# Patient Record
Sex: FEMALE | Race: WHITE | Employment: FULL TIME | URBAN - METROPOLITAN AREA
[De-identification: names, ages, dates, MRNs, and addresses within clinical notes are randomized per-mention and may not be internally consistent; named-entity substitution may affect disease eponyms.]

---

## 2018-07-16 PROCEDURE — 88175 CYTOPATH C/V AUTO FLUID REDO: CPT | Performed by: OBSTETRICS & GYNECOLOGY

## 2018-07-17 PROCEDURE — 84402 ASSAY OF FREE TESTOSTERONE: CPT | Performed by: OBSTETRICS & GYNECOLOGY

## 2018-07-17 PROCEDURE — 83002 ASSAY OF GONADOTROPIN (LH): CPT | Performed by: OBSTETRICS & GYNECOLOGY

## 2018-07-17 PROCEDURE — 83001 ASSAY OF GONADOTROPIN (FSH): CPT | Performed by: OBSTETRICS & GYNECOLOGY

## 2018-07-17 PROCEDURE — 83520 IMMUNOASSAY QUANT NOS NONAB: CPT | Performed by: OBSTETRICS & GYNECOLOGY

## 2018-07-17 PROCEDURE — 84403 ASSAY OF TOTAL TESTOSTERONE: CPT | Performed by: OBSTETRICS & GYNECOLOGY

## 2018-07-17 PROCEDURE — 82627 DEHYDROEPIANDROSTERONE: CPT | Performed by: OBSTETRICS & GYNECOLOGY

## 2025-03-14 ENCOUNTER — TELEPHONE (OUTPATIENT)
Dept: OBGYN UNIT | Facility: HOSPITAL | Age: 30
End: 2025-03-14

## 2025-03-14 RX ORDER — MULTIVIT,IRON,MINERALS/LUTEIN
TABLET ORAL
Status: ON HOLD | COMMUNITY

## 2025-03-17 ENCOUNTER — APPOINTMENT (OUTPATIENT)
Dept: OBGYN CLINIC | Facility: HOSPITAL | Age: 30
End: 2025-03-17
Attending: STUDENT IN AN ORGANIZED HEALTH CARE EDUCATION/TRAINING PROGRAM
Payer: COMMERCIAL

## 2025-03-17 ENCOUNTER — HOSPITAL ENCOUNTER (INPATIENT)
Facility: HOSPITAL | Age: 30
LOS: 4 days | Discharge: HOME | End: 2025-03-21
Attending: STUDENT IN AN ORGANIZED HEALTH CARE EDUCATION/TRAINING PROGRAM | Admitting: STUDENT IN AN ORGANIZED HEALTH CARE EDUCATION/TRAINING PROGRAM
Payer: COMMERCIAL

## 2025-03-17 PROBLEM — Z34.90 PREGNANCY (HCC): Status: ACTIVE | Noted: 2025-03-17

## 2025-03-17 LAB
ANTIBODY SCREEN: NEGATIVE
BASOPHILS # BLD AUTO: 0.05 X10(3) UL (ref 0–0.2)
BASOPHILS NFR BLD AUTO: 0.4 %
DEPRECATED RDW RBC AUTO: 43.4 FL (ref 35.1–46.3)
EOSINOPHIL # BLD AUTO: 0.24 X10(3) UL (ref 0–0.7)
EOSINOPHIL NFR BLD AUTO: 2 %
ERYTHROCYTE [DISTWIDTH] IN BLOOD BY AUTOMATED COUNT: 15.2 % (ref 11–15)
GLUCOSE BLDC GLUCOMTR-MCNC: 85 MG/DL (ref 70–99)
GLUCOSE BLDC GLUCOMTR-MCNC: 87 MG/DL (ref 70–99)
HCT VFR BLD AUTO: 40.3 %
HGB BLD-MCNC: 13.4 G/DL
IMM GRANULOCYTES # BLD AUTO: 0.07 X10(3) UL (ref 0–1)
IMM GRANULOCYTES NFR BLD: 0.6 %
LYMPHOCYTES # BLD AUTO: 1.96 X10(3) UL (ref 1–4)
LYMPHOCYTES NFR BLD AUTO: 16.3 %
MCH RBC QN AUTO: 26.4 PG (ref 26–34)
MCHC RBC AUTO-ENTMCNC: 33.3 G/DL (ref 31–37)
MCV RBC AUTO: 79.3 FL
MONOCYTES # BLD AUTO: 1.34 X10(3) UL (ref 0.1–1)
MONOCYTES NFR BLD AUTO: 11.2 %
NEUTROPHILS # BLD AUTO: 8.34 X10 (3) UL (ref 1.5–7.7)
NEUTROPHILS # BLD AUTO: 8.34 X10(3) UL (ref 1.5–7.7)
NEUTROPHILS NFR BLD AUTO: 69.5 %
PLATELET # BLD AUTO: 290 10(3)UL (ref 150–450)
RBC # BLD AUTO: 5.08 X10(6)UL
RH BLOOD TYPE: POSITIVE
RH BLOOD TYPE: POSITIVE
T PALLIDUM AB SER QL IA: NONREACTIVE
WBC # BLD AUTO: 12 X10(3) UL (ref 4–11)

## 2025-03-17 PROCEDURE — 86901 BLOOD TYPING SEROLOGIC RH(D): CPT | Performed by: STUDENT IN AN ORGANIZED HEALTH CARE EDUCATION/TRAINING PROGRAM

## 2025-03-17 PROCEDURE — 86900 BLOOD TYPING SEROLOGIC ABO: CPT | Performed by: STUDENT IN AN ORGANIZED HEALTH CARE EDUCATION/TRAINING PROGRAM

## 2025-03-17 PROCEDURE — 82962 GLUCOSE BLOOD TEST: CPT

## 2025-03-17 PROCEDURE — 85025 COMPLETE CBC W/AUTO DIFF WBC: CPT | Performed by: STUDENT IN AN ORGANIZED HEALTH CARE EDUCATION/TRAINING PROGRAM

## 2025-03-17 PROCEDURE — 86850 RBC ANTIBODY SCREEN: CPT | Performed by: STUDENT IN AN ORGANIZED HEALTH CARE EDUCATION/TRAINING PROGRAM

## 2025-03-17 PROCEDURE — 86780 TREPONEMA PALLIDUM: CPT | Performed by: STUDENT IN AN ORGANIZED HEALTH CARE EDUCATION/TRAINING PROGRAM

## 2025-03-17 PROCEDURE — 3E0P7VZ INTRODUCTION OF HORMONE INTO FEMALE REPRODUCTIVE, VIA NATURAL OR ARTIFICIAL OPENING: ICD-10-PCS | Performed by: STUDENT IN AN ORGANIZED HEALTH CARE EDUCATION/TRAINING PROGRAM

## 2025-03-17 RX ORDER — DEXTROSE, SODIUM CHLORIDE, SODIUM LACTATE, POTASSIUM CHLORIDE, AND CALCIUM CHLORIDE 5; .6; .31; .03; .02 G/100ML; G/100ML; G/100ML; G/100ML; G/100ML
INJECTION, SOLUTION INTRAVENOUS CONTINUOUS
Status: DISCONTINUED | OUTPATIENT
Start: 2025-03-17 | End: 2025-03-19 | Stop reason: HOSPADM

## 2025-03-17 RX ORDER — LIDOCAINE HYDROCHLORIDE 10 MG/ML
30 INJECTION, SOLUTION EPIDURAL; INFILTRATION; INTRACAUDAL; PERINEURAL ONCE
Status: DISCONTINUED | OUTPATIENT
Start: 2025-03-17 | End: 2025-03-19 | Stop reason: HOSPADM

## 2025-03-17 RX ORDER — ONDANSETRON 2 MG/ML
4 INJECTION INTRAMUSCULAR; INTRAVENOUS EVERY 6 HOURS PRN
Status: DISCONTINUED | OUTPATIENT
Start: 2025-03-17 | End: 2025-03-19 | Stop reason: HOSPADM

## 2025-03-17 RX ORDER — IBUPROFEN 600 MG/1
600 TABLET, FILM COATED ORAL ONCE AS NEEDED
Status: DISCONTINUED | OUTPATIENT
Start: 2025-03-17 | End: 2025-03-19 | Stop reason: HOSPADM

## 2025-03-17 RX ORDER — SODIUM CHLORIDE, SODIUM LACTATE, POTASSIUM CHLORIDE, CALCIUM CHLORIDE 600; 310; 30; 20 MG/100ML; MG/100ML; MG/100ML; MG/100ML
INJECTION, SOLUTION INTRAVENOUS AS NEEDED
Status: DISCONTINUED | OUTPATIENT
Start: 2025-03-17 | End: 2025-03-19 | Stop reason: HOSPADM

## 2025-03-17 RX ORDER — CITRIC ACID/SODIUM CITRATE 334-500MG
30 SOLUTION, ORAL ORAL AS NEEDED
Status: DISCONTINUED | OUTPATIENT
Start: 2025-03-17 | End: 2025-03-19 | Stop reason: HOSPADM

## 2025-03-17 RX ORDER — ACETAMINOPHEN 500 MG
1000 TABLET ORAL EVERY 6 HOURS PRN
Status: DISCONTINUED | OUTPATIENT
Start: 2025-03-17 | End: 2025-03-19 | Stop reason: HOSPADM

## 2025-03-17 RX ORDER — TERBUTALINE SULFATE 1 MG/ML
0.25 INJECTION SUBCUTANEOUS AS NEEDED
Status: DISCONTINUED | OUTPATIENT
Start: 2025-03-17 | End: 2025-03-19 | Stop reason: HOSPADM

## 2025-03-17 RX ORDER — ACETAMINOPHEN 500 MG
500 TABLET ORAL EVERY 6 HOURS PRN
Status: DISCONTINUED | OUTPATIENT
Start: 2025-03-17 | End: 2025-03-19 | Stop reason: HOSPADM

## 2025-03-17 NOTE — PROGRESS NOTES
Pt is a 29 year old female admitted to LDR3/LDR3-A.     Chief Complaint   Patient presents with    Scheduled Induction     A2GDM      Pt is  Unknown intra-uterine pregnancy.  History obtained, consents signed. Oriented to room, staff, and plan of care.

## 2025-03-18 ENCOUNTER — ANESTHESIA EVENT (OUTPATIENT)
Dept: OBGYN UNIT | Facility: HOSPITAL | Age: 30
End: 2025-03-18
Payer: COMMERCIAL

## 2025-03-18 ENCOUNTER — ANESTHESIA (OUTPATIENT)
Dept: OBGYN UNIT | Facility: HOSPITAL | Age: 30
End: 2025-03-18
Payer: COMMERCIAL

## 2025-03-18 LAB
ALBUMIN SERPL-MCNC: 3.8 G/DL (ref 3.2–4.8)
ALBUMIN/GLOB SERPL: 1.7 {RATIO} (ref 1–2)
ALP LIVER SERPL-CCNC: 117 U/L
ALT SERPL-CCNC: 12 U/L
ANION GAP SERPL CALC-SCNC: 9 MMOL/L (ref 0–18)
AST SERPL-CCNC: 16 U/L (ref ?–34)
BILIRUB SERPL-MCNC: 0.7 MG/DL (ref 0.3–1.2)
BUN BLD-MCNC: 8 MG/DL (ref 9–23)
BUN/CREAT SERPL: 9.2 (ref 10–20)
CALCIUM BLD-MCNC: 9.3 MG/DL (ref 8.7–10.4)
CHLORIDE SERPL-SCNC: 107 MMOL/L (ref 98–112)
CO2 SERPL-SCNC: 22 MMOL/L (ref 21–32)
CREAT BLD-MCNC: 0.87 MG/DL
CREAT UR-SCNC: <3 MG/DL
EGFRCR SERPLBLD CKD-EPI 2021: 92 ML/MIN/1.73M2 (ref 60–?)
GLOBULIN PLAS-MCNC: 2.3 G/DL (ref 2–3.5)
GLUCOSE BLD-MCNC: 96 MG/DL (ref 70–99)
GLUCOSE BLDC GLUCOMTR-MCNC: 69 MG/DL (ref 70–99)
GLUCOSE BLDC GLUCOMTR-MCNC: 78 MG/DL (ref 70–99)
GLUCOSE BLDC GLUCOMTR-MCNC: 79 MG/DL (ref 70–99)
GLUCOSE BLDC GLUCOMTR-MCNC: 84 MG/DL (ref 70–99)
GLUCOSE BLDC GLUCOMTR-MCNC: 87 MG/DL (ref 70–99)
GLUCOSE BLDC GLUCOMTR-MCNC: 90 MG/DL (ref 70–99)
OSMOLALITY SERPL CALC.SUM OF ELEC: 284 MOSM/KG (ref 275–295)
POTASSIUM SERPL-SCNC: 3.8 MMOL/L (ref 3.5–5.1)
PROT SERPL-MCNC: 6.1 G/DL (ref 5.7–8.2)
PROT UR-MCNC: <6 MG/DL (ref ?–14)
SODIUM SERPL-SCNC: 138 MMOL/L (ref 136–145)

## 2025-03-18 PROCEDURE — 84156 ASSAY OF PROTEIN URINE: CPT | Performed by: OBSTETRICS & GYNECOLOGY

## 2025-03-18 PROCEDURE — 82962 GLUCOSE BLOOD TEST: CPT

## 2025-03-18 PROCEDURE — 80053 COMPREHEN METABOLIC PANEL: CPT | Performed by: OBSTETRICS & GYNECOLOGY

## 2025-03-18 PROCEDURE — 82570 ASSAY OF URINE CREATININE: CPT | Performed by: OBSTETRICS & GYNECOLOGY

## 2025-03-18 PROCEDURE — 10907ZC DRAINAGE OF AMNIOTIC FLUID, THERAPEUTIC FROM PRODUCTS OF CONCEPTION, VIA NATURAL OR ARTIFICIAL OPENING: ICD-10-PCS | Performed by: OBSTETRICS & GYNECOLOGY

## 2025-03-18 RX ORDER — LIDOCAINE HYDROCHLORIDE AND EPINEPHRINE 15; 5 MG/ML; UG/ML
INJECTION, SOLUTION EPIDURAL
Status: COMPLETED | OUTPATIENT
Start: 2025-03-18 | End: 2025-03-18

## 2025-03-18 RX ORDER — BUPIVACAINE HCL/0.9 % NACL/PF 0.25 %
5 PLASTIC BAG, INJECTION (ML) EPIDURAL AS NEEDED
Status: DISCONTINUED | OUTPATIENT
Start: 2025-03-18 | End: 2025-03-19

## 2025-03-18 RX ORDER — NALBUPHINE HYDROCHLORIDE 10 MG/ML
10 INJECTION INTRAMUSCULAR; INTRAVENOUS; SUBCUTANEOUS
Status: DISCONTINUED | OUTPATIENT
Start: 2025-03-18 | End: 2025-03-18

## 2025-03-18 RX ORDER — HYDROXYZINE HYDROCHLORIDE 50 MG/ML
50 INJECTION, SOLUTION INTRAMUSCULAR EVERY 4 HOURS PRN
Status: DISCONTINUED | OUTPATIENT
Start: 2025-03-18 | End: 2025-03-19

## 2025-03-18 RX ORDER — NALBUPHINE HYDROCHLORIDE 10 MG/ML
10 INJECTION INTRAMUSCULAR; INTRAVENOUS; SUBCUTANEOUS EVERY 4 HOURS PRN
Status: DISCONTINUED | OUTPATIENT
Start: 2025-03-18 | End: 2025-03-19

## 2025-03-18 RX ORDER — BUPIVACAINE HYDROCHLORIDE 2.5 MG/ML
INJECTION, SOLUTION EPIDURAL; INFILTRATION; INTRACAUDAL; PERINEURAL
Status: COMPLETED | OUTPATIENT
Start: 2025-03-18 | End: 2025-03-18

## 2025-03-18 RX ORDER — NALBUPHINE HYDROCHLORIDE 10 MG/ML
2.5 INJECTION INTRAMUSCULAR; INTRAVENOUS; SUBCUTANEOUS
Status: DISCONTINUED | OUTPATIENT
Start: 2025-03-18 | End: 2025-03-19

## 2025-03-18 RX ORDER — BUPIVACAINE HYDROCHLORIDE 2.5 MG/ML
20 INJECTION, SOLUTION EPIDURAL; INFILTRATION; INTRACAUDAL; PERINEURAL ONCE
Status: DISCONTINUED | OUTPATIENT
Start: 2025-03-18 | End: 2025-03-19 | Stop reason: HOSPADM

## 2025-03-18 RX ORDER — BUPIVACAINE HYDROCHLORIDE 2.5 MG/ML
20 INJECTION, SOLUTION EPIDURAL; INFILTRATION; INTRACAUDAL; PERINEURAL ONCE
Status: COMPLETED | OUTPATIENT
Start: 2025-03-18 | End: 2025-03-18

## 2025-03-18 RX ORDER — HYDROXYZINE HYDROCHLORIDE 50 MG/ML
INJECTION, SOLUTION INTRAMUSCULAR
Status: COMPLETED
Start: 2025-03-18 | End: 2025-03-18

## 2025-03-18 RX ADMIN — LIDOCAINE HYDROCHLORIDE AND EPINEPHRINE 5 ML: 15; 5 INJECTION, SOLUTION EPIDURAL at 19:13:00

## 2025-03-18 RX ADMIN — BUPIVACAINE HYDROCHLORIDE 5 ML: 2.5 INJECTION, SOLUTION EPIDURAL; INFILTRATION; INTRACAUDAL; PERINEURAL at 19:13:00

## 2025-03-18 NOTE — PROGRESS NOTES
[facility]  part of [facility]    Labor Progress Note    Frank Powell Patient Status:  Inpatient    1995 MRN [id number]   Location [facility] Attending Jeni Stallings MD   Hosp Day # 1 PCP Hesham Segovia MD     Subjective:   Interval History:   tired    Objective:   Vital signs in last 24 hours:  Temp:  [98.1 °F (36.7 °C)-99 °F (37.2 °C)] 99 °F (37.2 °C)  Pulse:  [67-86] 67  Resp:  [16-18] 18  BP: (133-154)/(58-89) 150/82    Input/Output:  No intake or output data in the 24 hours ending 25 1417    Fetal Surveillance:  Fetal heart tones: accelerations  Uterine contractions: adequate    Sterile vaginal exam:  Dilation: 2 cm dilation   Effacement: 50%   Station: -2   Position: Cephalic  Presentation: vertex    Results:   Lab Results   Component Value Date    TREPONEMALAB Nonreactive 2025    ABO O 2025    RH Positive 2025    WBC 12.0 (H) 2025    HGB 13.4 2025    HCT 40.3 2025    .0 2025    CREATSERUM 1.07 (H) 2018    BUN 11 2018     2018    K 4.4 2018     2018    CO2 24.3 2018    GLU 86 2018    CA 9.2 2018    ALB 4.2 2018    ALKPHO 74 2018    BILT 0.47 2018    TP 7.5 2018    AST 18 2018    ALT 39 2018    TSH 1.818 2018       No results found for: \"DDIMER\", \"ESRML\", \"ESRPF\", \"CRP\", \"BNP\", \"MG\", \"PHOS\", \"TROP\", \"TROPHS\", \"CK\", \"CKMB\", \"TRACEE\", \"RPR\", \"B12\", \"ETOH\", \"COLORUR\", \"CLARITY\", \"SPECGRAVITY\", \"PROUR\", \"GLUUR\", \"KETUR\", \"BILUR\", \"BLOODURINE\", \"NITRITE\", \"UROBILINOGEN\", \"LEUUR\", \"UASA\", \"POCGLU\", \"BLDCUL\", \"BLDSMR\"    Assessment & Plan:     Pregnancy (HCC)  AROM clear fluid.  CPM    Plan discussed with patient who verbalizes understanding and agreement.    Jeni Stallings MD  3/18/2025

## 2025-03-18 NOTE — PROGRESS NOTES
[facility]  part of [facility]    Labor Progress Note    Frank Powell Patient Status:  Inpatient    1995 MRN [id number]   Location [facility] Attending Joan Hurt,    Hosp Day # 1 PCP Hesham Segovia MD     Subjective:   Interval History:   Feeling some contractions    Objective:   Vital signs in last 24 hours:  Temp:  [98.1 °F (36.7 °C)-98.4 °F (36.9 °C)] 98.4 °F (36.9 °C)  Pulse:  [71-86] 71  Resp:  [16-18] 18  BP: (133-141)/(58-67) 141/64    Input/Output:  No intake or output data in the 24 hours ending 25 0731    Fetal Surveillance:  Fetal heart tones: variability  Uterine contractions: inadequate    Sterile vaginal exam:  Dilation: 2 cm dilation   Effacement: 50%   Station: -2   Position: Cephalic  Presentation: vertex    Results:   Lab Results   Component Value Date    TREPONEMALAB Nonreactive 2025    ABO O 2025    RH Positive 2025    WBC 12.0 (H) 2025    HGB 13.4 2025    HCT 40.3 2025    .0 2025    CREATSERUM 1.07 (H) 2018    BUN 11 2018     2018    K 4.4 2018     2018    CO2 24.3 2018    GLU 86 2018    CA 9.2 2018    ALB 4.2 2018    ALKPHO 74 2018    BILT 0.47 2018    TP 7.5 2018    AST 18 2018    ALT 39 2018    TSH 1.818 2018       No results found for: \"DDIMER\", \"ESRML\", \"ESRPF\", \"CRP\", \"BNP\", \"MG\", \"PHOS\", \"TROP\", \"TROPHS\", \"CK\", \"CKMB\", \"TRACEE\", \"RPR\", \"B12\", \"ETOH\", \"COLORUR\", \"CLARITY\", \"SPECGRAVITY\", \"PROUR\", \"GLUUR\", \"KETUR\", \"BILUR\", \"BLOODURINE\", \"NITRITE\", \"UROBILINOGEN\", \"LEUUR\", \"UASA\", \"POCGLU\", \"BLDCUL\", \"BLDSMR\"    Assessment & Plan:     Pregnancy (HCC)      Good progress with misoprostol.  Plan light breakfast, then pitocin.  AROM when feasible.     Plan discussed with patient who verbalizes understanding and agreement.    Jeni Stallings MD  3/18/2025

## 2025-03-18 NOTE — PLAN OF CARE
Problem: Patient Centered Care  Goal: Patient preferences are identified and integrated in the patient's plan of care  Description: Interventions:- What would you like us to know as we care for you? I would like an epidural- Provide timely, complete, and accurate information to patient/family- Incorporate patient and family knowledge, values, beliefs, and cultural backgrounds into the planning and delivery of care- Encourage patient/family to participate in care and decision-making at the level they choose- Honor patient and family perspectives and choices  Outcome: Progressing

## 2025-03-18 NOTE — H&P
[facility]  part of [facility]    History & Physical    Frank Powell Patient Status:  Inpatient    1995 MRN [id number]   Location [facility] Attending Joan Hurt,    Hosp Day # 0 PCP Hesham Segovia MD     Date of Admission:  3/17/2025      HPI:   Frank Powell is a 29 year old  female, current EGA of 38w0d with an estimated date of delivery of: 3/31/2025, by Last Menstrual Period who presents due to scheduled induction of labor     Being admitted for induction of labor.      Her current obstetrical history is significant for GDMA2 and obesity.    Patient Active Problem List   Diagnosis    Obesity peds (BMI >=95 percentile)    History of slipped capital femoral epiphysis (SCFE)    Patellofemoral instability with pain    Pregnancy (HCC)         History   Obstetric History:   OB History    Para Term  AB Living   1 0 0 0 0 0   SAB IAB Ectopic Multiple Live Births   0 0 0 0        # Outcome Date GA Lbr Seth/2nd Weight Sex Type Anes PTL Lv   1 Current                Past Medical History:   Past Medical History:    Gestational diabetes (HCC)       Past Surgerical History:   Past Surgical History:   Procedure Laterality Date    Hip surgery Left        Social History:   Social History     Tobacco Use    Smoking status: Former     Current packs/day: 0.00     Types: Cigarettes     Quit date: 2020     Years since quittin.2    Smokeless tobacco: Never    Tobacco comments:     daniel pugh (as of 21)   Substance Use Topics    Alcohol use: No        Allergies/Medications:   Allergies:   Allergies[1]    Medications:  Prescriptions Prior to Admission[2]      Review of Systems:   As documented in HPI      Physical Exam:   Temp:  [98.1 °F (36.7 °C)] 98.1 °F (36.7 °C)  Pulse:  [78-86] 86  Resp:  [16-18] 18  BP: (133-135)/(58-67) 135/58    Neurologic: Alert and oriented  Psychiatric: Cooperative  Constitutional: alert and  cooperative  Pulm: non labored breathing  CV: regular rate  Abdomen: soft,  nontender, gravid    SVE: closed/th/hi    FHT assessment: 125bpm/mod/+accels/-decels  [place]: none       Results:     Recent Results (from the past 24 hours)   CBC With Differential With Platelet    Collection Time: 03/17/25  6:28 PM   Result Value Ref Range    WBC 12.0 (H) 4.0 - 11.0 x10(3) uL    RBC 5.08 3.80 - 5.30 x10(6)uL    HGB 13.4 12.0 - 16.0 g/dL    HCT 40.3 35.0 - 48.0 %    MCV 79.3 (L) 80.0 - 100.0 fL    MCH 26.4 26.0 - 34.0 pg    MCHC 33.3 31.0 - 37.0 g/dL    RDW-SD 43.4 35.1 - 46.3 fL    RDW 15.2 (H) 11.0 - 15.0 %    .0 150.0 - 450.0 10(3)uL    Neutrophil Absolute Prelim 8.34 (H) 1.50 - 7.70 x10 (3) uL    Neutrophil Absolute 8.34 (H) 1.50 - 7.70 x10(3) uL    Lymphocyte Absolute 1.96 1.00 - 4.00 x10(3) uL    Monocyte Absolute 1.34 (H) 0.10 - 1.00 x10(3) uL    Eosinophil Absolute 0.24 0.00 - 0.70 x10(3) uL    Basophil Absolute 0.05 0.00 - 0.20 x10(3) uL    Immature Granulocyte Absolute 0.07 0.00 - 1.00 x10(3) uL    Neutrophil % 69.5 %    Lymphocyte % 16.3 %    Monocyte % 11.2 %    Eosinophil % 2.0 %    Basophil % 0.4 %    Immature Granulocyte % 0.6 %   ABORH (Blood Type)    Collection Time: 03/17/25  6:28 PM   Result Value Ref Range    ABO BLOOD TYPE O     RH BLOOD TYPE Positive    Antibody Screen    Collection Time: 03/17/25  6:28 PM   Result Value Ref Range    Antibody Screen Negative    ABORH Confirmation    Collection Time: 03/17/25  6:49 PM   Result Value Ref Range    ABO BLOOD TYPE O     RH BLOOD TYPE Positive    POCT Glucose    Collection Time: 03/17/25  7:19 PM   Result Value Ref Range    POC Glucose  85 70 - 99 mg/dL       Prenatal Labs  Blood Type:  Rh positive  HIV- non reactive  Syphilis- non reactive  Hepatitis B: non reactive  Hepatitis C: non reactive  Rubella immune  GBS positive.       Imaging:  Ultrasound Findings:  Single IUP in cephalic presentation.  Cardiac activity is present at 144 bpm.  Placenta is  anterior.   A 3 vessel cord is noted.  EFW 3359 g (7 lb 6 oz) 80%. AC is <99%tile.   WARD is 18.1 cm. MVP is 7.3 cm.  BPP is 8       Assessment/Plan:     Frank Powell is a 29 year old  female, current EGA of 38w0d who presents for admission due to induction of labor    IOL  Cytotec for cervical ripening    GDMA2  Hold meds  Accucheck q 4 hours      Joan Hurt DO  3/17/2025  7:50 PM         [1] No Known Allergies  [2]   Medications Prior to Admission   Medication Sig Dispense Refill Last Dose/Taking    Prenatal Vit-Fe Fumarate-FA (MULTI PRENATAL) 27-0.8 MG Oral Tab Take by mouth.   3/16/2025 Bedtime    metFORMIN  MG Oral Tablet 24 Hr Take 2 tablets (1,500 mg total) by mouth daily with breakfast. (Patient taking differently: Take 2 tablets (1,500 mg total) by mouth daily with breakfast. Takes 500 mg in the morning, and 1000mg before bed.) 180 tablet 0 3/16/2025 Bedtime

## 2025-03-19 LAB
GLUCOSE BLDC GLUCOMTR-MCNC: 78 MG/DL (ref 70–99)
GLUCOSE BLDC GLUCOMTR-MCNC: 80 MG/DL (ref 70–99)
GLUCOSE BLDC GLUCOMTR-MCNC: 82 MG/DL (ref 70–99)
GLUCOSE BLDC GLUCOMTR-MCNC: 84 MG/DL (ref 70–99)
GLUCOSE BLDC GLUCOMTR-MCNC: 88 MG/DL (ref 70–99)

## 2025-03-19 PROCEDURE — 82962 GLUCOSE BLOOD TEST: CPT

## 2025-03-19 PROCEDURE — 0KQM0ZZ REPAIR PERINEUM MUSCLE, OPEN APPROACH: ICD-10-PCS | Performed by: OBSTETRICS & GYNECOLOGY

## 2025-03-19 RX ORDER — ACETAMINOPHEN 500 MG
1000 TABLET ORAL EVERY 6 HOURS PRN
Status: DISCONTINUED | OUTPATIENT
Start: 2025-03-19 | End: 2025-03-21

## 2025-03-19 RX ORDER — AMMONIA 15 % (W/V)
0.3 AMPUL (EA) INHALATION AS NEEDED
Status: DISCONTINUED | OUTPATIENT
Start: 2025-03-19 | End: 2025-03-21

## 2025-03-19 RX ORDER — IBUPROFEN 600 MG/1
600 TABLET, FILM COATED ORAL EVERY 6 HOURS
Status: DISCONTINUED | OUTPATIENT
Start: 2025-03-19 | End: 2025-03-21

## 2025-03-19 RX ORDER — SIMETHICONE 80 MG
80 TABLET,CHEWABLE ORAL 3 TIMES DAILY PRN
Status: DISCONTINUED | OUTPATIENT
Start: 2025-03-19 | End: 2025-03-21

## 2025-03-19 RX ORDER — DOCUSATE SODIUM 100 MG/1
100 CAPSULE, LIQUID FILLED ORAL
Status: DISCONTINUED | OUTPATIENT
Start: 2025-03-19 | End: 2025-03-21

## 2025-03-19 RX ORDER — BISACODYL 10 MG
10 SUPPOSITORY, RECTAL RECTAL ONCE AS NEEDED
Status: DISCONTINUED | OUTPATIENT
Start: 2025-03-19 | End: 2025-03-21

## 2025-03-19 RX ORDER — ACETAMINOPHEN 500 MG
500 TABLET ORAL EVERY 6 HOURS PRN
Status: DISCONTINUED | OUTPATIENT
Start: 2025-03-19 | End: 2025-03-21

## 2025-03-19 NOTE — PROGRESS NOTES
Patient assisted to transfer to wheelchair with x1 assist.  Pt. Transferred without difficulty.   Pt. Then taken to [facility] to visit infant.  Pt. Transferred via wheelchair, then transfer to PP unit room 351.

## 2025-03-19 NOTE — PLAN OF CARE
Problem: Patient Centered Care  Goal: Patient preferences are identified and integrated in the patient's plan of care  Description: Interventions:- What would you like us to know as we care for you? Safe delivery- Provide timely, complete, and accurate information to patient/family- Incorporate patient and family knowledge, values, beliefs, and cultural backgrounds into the planning and delivery of care- Encourage patient/family to participate in care and decision-making at the level they choose- Honor patient and family perspectives and choices  Outcome: Progressing     Problem: Patient/Family Goals  Goal: Patient/Family Long Term Goal  Description: Patient's Long Term Goal: Uncomplicated Delivery     Interventions:  - Assessment/Monitoring  - Induction/Augmentation per protocol and Provider order  - C/S per protocol and Provider order   - Education  - Intervention per protocol and Provider order with education   - Involve patient in POC  - See additional Care Plan goals for specific interventions    Outcome: Progressing  Goal: Patient/Family Short Term Goal  Description: Patient's Short Term Goal: Comfort and Pain Control     Interventions:   - Non Pharmacological pain intervention   - IV/IM and Epidural pain medication per Provider order and patient request  - Education  - Involve Patient in POC   - See additional Care Plan goals for specific interventions      Outcome: Progressing     Problem: Patient/Family Goals  Goal: Patient/Family Short Term Goal  Description: Patient's Short Term Goal: Comfort and Pain Control     Interventions:   - Non Pharmacological pain intervention   - IV/IM and Epidural pain medication per Provider order and patient request  - Education  - Involve Patient in POC   - See additional Care Plan goals for specific interventions      Outcome: Progressing

## 2025-03-19 NOTE — ANESTHESIA PROCEDURE NOTES
Labor Analgesia    Date/Time: 3/18/2025 7:13 PM    Performed by: Surendra Aly MD  Authorized by: Surendra Aly MD      General Information and Staff    Start Time:  3/18/2025 7:13 PM  End Time:  3/18/2025 7:23 PM  Anesthesiologist:  Surendra Aly MD  Performed by:  Anesthesiologist  Patient Location:  OB  Site Identification: surface landmarks    Reason for Block: labor epidural    Preanesthetic Checklist: patient identified, IV checked, site marked, risks and benefits discussed, monitors and equipment checked, pre-op evaluation, timeout performed, anesthesia consent and sterile technique used      Procedure Details    Patient Position:  Sitting  Prep: ChloraPrep    Monitoring:  Heart rate  Approach:  Midline    Epidural Needle    Injection Technique:  SYLVIA air  Needle Type:  Tuohy  Needle Gauge:  18 G  Needle Length:  3.5 in  Needle Insertion Depth:  8  Location:  L3-4    Spinal Needle      Catheter    Catheter Type:  Multi-orifice  Catheter Size:  20 G  Catheter at Skin Depth:  15  Test Dose:  Negative    Assessment    Sensory Level:  T4    Additional Comments     First pass motor intact

## 2025-03-19 NOTE — PROGRESS NOTES
[facility]  part of [facility]    Labor Progress Note    Frank Powell Patient Status:  Inpatient    1995 MRN [id number]   Location [facility] Attending Consuelo Adan MD   Hosp Day # 2 PCP Hesham Segovia MD       Subjective   Interval History: Patient feeling pressure with contractions.    She reported to her nurse at shift change history of hip surgery. Discussed history with patient. She states that at age 9 she was thrown from a horse and had bilateral hip dislocation. She denies history of pelvic fracture. Reports screws were placed in her hips and she was told not to over flex her hips or the screws could \"puncture an artery\". She denies significant limitations in mobility. As a child was unable to sit \"zach-cross\" but is able to frog leg for cervical exams without difficulty.     Objective   Vital signs in last 24 hours:  Temp:  [98 °F (36.7 °C)-99 °F (37.2 °C)] 98.9 °F (37.2 °C)  Pulse:  [] 96  Resp:  [16-18] 16  BP: (104-160)/(44-86) 104/85  SpO2:  [95 %-100 %] 99 %      General: starting to feel uncomfortable with contractions    FHT: 140 / moderate variability / positive accelerations / no decelerations    [place]: q3-4 minutes    Cervix: 9 / 100 / +1        Assessment/Plan       Patient with significant cervical change since last check. Discussed history of bilateral hip injury/surgery. She was never specifically told she could or could not undergo a vaginal delivery but admits she never followed up with orthopedic surgery as an adult. Discussed given her lack of pelvic brim fracture and normal range of motion vaginal delivery is likely safe. Discussed ideal work up would include pelvic x-rays and orthopedic surgery consult. Given delivery is imminent we are unable to obtain this complete work-up. Recommended proceeding with trial of vaginal delivery, will push in closed knee position as much as able and avoid hyperflexion of hips.  Patient and partner at bedside agreeable with plan.

## 2025-03-19 NOTE — ANESTHESIA PREPROCEDURE EVALUATION
Anesthesia PreOp Note    HPI:     Frank Powell is a 29 year old female who presents for preoperative consultation requested by: * No surgeons listed *    Date of Surgery: 3/18/2025    * No procedures listed *  Indication: * No pre-op diagnosis entered *    Relevant Problems   No relevant active problems       NPO:                         History Review:  Patient Active Problem List    Diagnosis Date Noted   • Pregnancy (HCC) 2025   • Patellofemoral instability with pain 01/15/2014   • History of slipped capital femoral epiphysis (SCFE) 2010   • Obesity peds (BMI >=95 percentile) 2008       Past Medical History:   • Gestational diabetes (HCC)       Past Surgical History:   Procedure Laterality Date   • Hip surgery Left        Prescriptions Prior to Admission[1]  Current Medications and Prescriptions Ordered in Epic[2]    Allergies[3]    Family History   Problem Relation Age of Onset   • Cancer Paternal Grandmother    • Learning Disability Father    • Other (Strabismus) Father      Social History     Socioeconomic History   • Marital status:    Tobacco Use   • Smoking status: Former     Current packs/day: 0.00     Types: Cigarettes     Quit date: 2020     Years since quittin.2   • Smokeless tobacco: Never   • Tobacco comments:     vapes daitrell (as of 21)   Vaping Use   • Vaping status: Former   Substance and Sexual Activity   • Alcohol use: No   • Drug use: No   • Sexual activity: Never       Available pre-op labs reviewed.  Lab Results   Component Value Date    WBC 12.0 (H) 2025    RBC 5.08 2025    HGB 13.4 2025    HCT 40.3 2025    MCV 79.3 (L) 2025    MCH 26.4 2025    MCHC 33.3 2025    RDW 15.2 (H) 2025    .0 2025     Lab Results   Component Value Date    PGLU 69 (L) 2025          Vital Signs:  Body mass index is 48.59 kg/m².   weight is 132.5 kg (292 lb). Her oral temperature is 98.5 °F (36.9 °C). Her blood  pressure is 151/81 and her pulse is 77. Her respiration is 18.   Vitals:    03/18/25 0345 03/18/25 0730 03/18/25 1145 03/18/25 1520   BP: 141/64 154/89 150/82 151/81   Pulse: 71 72 67 77   Resp: 18 18 18 18   Temp: 98.4 °F (36.9 °C) 98.7 °F (37.1 °C) 99 °F (37.2 °C) 98.5 °F (36.9 °C)   TempSrc: Oral Oral Oral Oral   Weight:            Anesthesia Evaluation     Patient summary reviewed and Nursing notes reviewed    Airway   Mallampati: I  TM distance: >3 FB  Neck ROM: full  Dental - Dentition appears grossly intact     Pulmonary - negative ROS and normal exam   Cardiovascular - negative ROS and normal exam    Neuro/Psych - negative ROS     GI/Hepatic/Renal - negative ROS     Endo/Other    (+) diabetes mellitus  Abdominal  - normal exam               Anesthesia Plan:   ASA:  2  Plan:   Epidural  Informed Consent Plan and Risks Discussed With:  Patient    I have informed Frank A Andre and/or legal guardian or family member of the nature of the anesthetic plan, benefits, risks including possible dental damage if relevant, major complications, and any alternative forms of anesthetic management.   All of the patient's questions were answered to the best of my ability. The patient desires the anesthetic management as planned.  BILLY BONILLA MD  3/18/2025 7:35 PM  Present on Admission:  **None**           [1]   Medications Prior to Admission   Medication Sig Dispense Refill Last Dose/Taking   • Prenatal Vit-Fe Fumarate-FA (MULTI PRENATAL) 27-0.8 MG Oral Tab Take by mouth.   3/16/2025 Bedtime   • metFORMIN  MG Oral Tablet 24 Hr Take 2 tablets (1,500 mg total) by mouth daily with breakfast. (Patient taking differently: Take 2 tablets (1,500 mg total) by mouth daily with breakfast. Takes 500 mg in the morning, and 1000mg before bed.) 180 tablet 0 3/16/2025 Bedtime   [2]   Current Facility-Administered Medications Ordered in Epic   Medication Dose Route Frequency Provider Last Rate Last Admin   • hydrOXYzine 50 mg/mL  injection 50 mg  50 mg Intramuscular Q4H PRN Joan Hurt, DO   50 mg at 03/18/25 0407   • nalbuphine (Nubain) 10 mg/mL injection 10 mg  10 mg Intravenous Q4H PRN Joan Hurt DO   10 mg at 03/18/25 0406   • oxyTOCIN in sodium chloride 0.9% (Pitocin) 30 Units/500mL infusion premix  0.5-20 raheem-units/min Intravenous Continuous Jeni Stallings MD 8 mL/hr at 03/18/25 1820 8 raheem-units/min at 03/18/25 1820   • metFORMIN (Glucophage) tab 500 mg  500 mg Oral Daily with breakfast Jeni Stallings MD   500 mg at 03/18/25 0817   • lactated ringers IV bolus 1,000 mL  1,000 mL Intravenous Once Jeni Stallings MD       • fentaNYL-bupivacaine 2 mcg/mL-0.125% in sodium chloride 0.9% 100 mL EPIDURAL infusion premix   Epidural Continuous Jeni Stallings MD       • fentaNYL (Sublimaze) 50 mcg/mL injection 100 mcg  100 mcg Epidural Once Jnei Stallings MD       • bupivacaine PF (Marcaine) 0.25% injection  20 mL Epidural Once Jeni Stallings MD       • ePHEDrine (PF) 25 MG/5 ML injection 5 mg  5 mg Intravenous PRN Jeni Stallings MD       • nalbuphine (Nubain) 10 mg/mL injection 2.5 mg  2.5 mg Intravenous Q15 Min PRN Jeni Stallings MD       • fentaNYL (Sublimaze) 50 mcg/mL injection 50 mcg  50 mcg Intravenous Q30 Min PRN Jeni Stallings MD   50 mcg at 03/18/25 1820   • dextrose in lactated ringers 5% infusion   Intravenous Continuous Joan Hurt, DO       • lactated ringers infusion   Intravenous PRN Joan Hurt,  mL/hr at 03/17/25 1830 125 mL/hr at 03/17/25 1830   • lactated ringers IV bolus 500 mL  500 mL Intravenous PRN Joan Hurt  mL/hr at 03/18/25 1151 500 mL at 03/18/25 1151   • acetaminophen (Tylenol Extra Strength) tab 500 mg  500 mg Oral Q6H PRN Blu, Joan, DO       • acetaminophen (Tylenol Extra Strength) tab 1,000 mg  1,000 mg Oral Q6H PRN Blu, Joan, DO       • ibuprofen (Motrin) tab 600 mg  600 mg Oral Once PRN Blu, Joan, DO       • ondansetron (Zofran) 4 MG/2ML injection 4 mg   4 mg Intravenous Q6H PRN Joan Hurt DO       • oxyTOCIN in sodium chloride 0.9% (Pitocin) 30 Units/500mL infusion premix  62.5-900 raheem-units/min Intravenous Continuous Joan Hurt DO       • terbutaline (Brethine) 1 MG/ML injection 0.25 mg  0.25 mg Subcutaneous PRN Joan Hurt DO       • sodium citrate-citric acid (Bicitra) 500-334 MG/5ML oral solution 30 mL  30 mL Oral PRN Joan Hurt DO       • lidocaine PF (Xylocaine-MPF) 1% injection  30 mL Intradermal Once Joan Hurt DO       • ampicillin (Omnipen) 1 g in sodium chloride 0.9% 100 mL IVPB-MBP  1 g Intravenous Q4H Joan Hurt  mL/hr at 03/18/25 1920 1 g at 03/18/25 1920     No current Epic-ordered outpatient medications on file.   [3] No Known Allergies

## 2025-03-19 NOTE — ANESTHESIA POSTPROCEDURE EVALUATION
Patient: Frank Powell    Procedure Summary       Date: 03/18/25 Room / Location:     Anesthesia Start: 1913 Anesthesia Stop: 03/19/25 1337    Procedure: LABOR ANALGESIA Diagnosis:     Scheduled Providers:  Anesthesiologist: Gabriel Zepeda MD    Anesthesia Type: epidural ASA Status: 2            Anesthesia Type: epidural    Vitals Value Taken Time   /73 03/19/25 1430   Temp 98.2 °F (36.8 °C) 03/19/25 1430   Pulse 89 03/19/25 1434   Resp 16 03/19/25 1430   SpO2 100 % 03/19/25 1434   Vitals shown include unfiled device data.    [facility] AN Post Evaluation:   Patient Evaluated in PACU  Patient Participation: complete - patient participated  Level of Consciousness: awake  Pain Management: adequate  Airway Patency:patent  Dental exam unchanged from preop  Yes    Cardiovascular Status: acceptable  Respiratory Status: acceptable  Postoperative Hydration acceptable      BILLY BONILLA MD  3/19/2025 5:14 PM

## 2025-03-19 NOTE — L&D DELIVERY NOTE
Vaginal Delivery Note    Called to patient's room for variable decelerations immediately following delivery of another patient on the floor. Patient pushing with contractions, excellent maternal effort and making good progress. Fetal heart rate 155 with moderate variability and variable decelerations with contractions. Tracing difficult to fully interpret, variable decelerations to the 90's and following maternal heart rate. Fetal heart rate overall difficult to trace consistently while pushing due to maternal body habitus. At bedside during the next 10-15 minutes of patient pushing, fetal head brought from +3 station to fetal crown within that time with recovery of fetal heart rate between variable decelerations. Decision made to call for Neonatology to attend delivery and patient prepped for delivery. With the next contraction, variable deceleration again occurred with jatin to the 90's, tracing very close to maternal heart rate. This time, delayed recovery with deceleration lasting approximately 3.5 minutes. Patient delivered with the next two contractions. Fetal head delivered KATELYNN over intact perineum, loose nuchal x1 noted and delivered through, followed without hesitation or delay by anterior shoulder and remainder of infant body.  Viable male infant  (\"Lukasz\") noted to have no tone, cord clamped and cut and infant taken to NICU staff immediately. APGARS 2/8/9 at 1, 5, and 10 minutes respectively. Cord gases and cord blood collected. Placenta delivered intact with fundal massage. Uterus explored noting minimal atony that improved with massage at pitocin. Small second degree laceration noted and repaired in usual fashion with 3-0 vicryl. Patient tolerated procedure well. EBL 200cc. Infant to [facility].       Yazan Powell [[id number]]      Labor Events     labor?: No   steroids?: None  Antibiotics received during labor?: Yes  Antibiotics (enter # doses in comment): ampicillin  Rupture  date/time: 3/18/2025 1355     Rupture type: AROM  Fluid color: Clear  Labor type: Induced Onset of Labor  Induction: Misoprostol, Oxytocin  Indications for induction: IDDM/GDDM  Intrapartum & labor complications: Group B beta strep +       Labor Event Times    Labor onset date/time: 3/18/2025 1355  Dilation complete date/time: 3/19/2025 1205  Start pushing date/time: 3/19/2025 1215       [place] Presentation    Presentation: Vertex  Position: Left Occiput Anterior       Operative Delivery    Operative Vaginal Delivery: No                Shoulder Dystocia    Shoulder Dystocia: No       Anesthesia    Method: Epidural              [place] Delivery      Head delivery date/time: 3/19/2025 13:30:45   Delivery date/time:  3/19/25 13:30:57   Delivery type: Normal spontaneous vaginal delivery    Details:     Delivery location: delivery room  Delivery Room Temperature: 74       Delivery Providers    Delivering Clinician: Consuelo Adan MD   Delivery personnel:  Provider Role   SEBASTIAN BOUDREAUX Baby Nurse   Merle Reid, RN Delivery Nurse   Rachel Taylor Surgical Tech   Elma Vu DO Neonatologist   Fatoumata Howard, RN Registered Nurse   Linda Cheung RN Registered Nurse             Cord    Vessels: 3 Vessels  Complications: Nuchal  # of loops: 1  Timed cord clamping: No  Cord blood disposition: cord blood bank  Gases sent?: Yes       [place] Measurements      Weight: 3990 g 8 lb 12.7 oz                Placenta    Date/time: 3/19/2025 1337  Removal: Spontaneous  Appearance: Intact  Disposition: Discarded       Apgars    Living status: Living   Apgar Scoring Key:    0 1 2    Skin color Blue or pale Acrocyanotic Completely pink    Heart rate Absent <100 bpm >100 bpm    Reflex irritability No response Grimace Cry or active withdrawal    Muscle tone Limp Some flexion Active motion    Respiratory effort Absent Weak cry; hypoventilation Good, crying              1 Minute:  5 Minute:  10 Minute:  15 Minute:  20  Minute:      Skin color: 0  2  2      Heart rate: 2  2  2      Reflex irritablity: 0  2  2      Muscle tone: 0  1  1      Respiratory effort: 0  1  2      Total: 2  8  9         Apgars assigned by: DR. IBARRA,NEONATOLOGIST,CARMEN MANLEY RN       Skin to Skin    Skin to skin initiated date/time: 3/19/2025 1350  Reason skin to skin not initiated: [place] [facility]       Vaginal Count    Initial count RN: Merle Reid RN  Initial count Tech: Taylor, Rachel   Sponges   Sharps    Initial counts 10   1    Final counts        Final count RN: Merle Reid RN  Final count MD: Consuelo Adan MD       Lacerations    Episiotomy: None  Perineal lacerations: 2nd Repaired?: Yes     Vaginal laceration?: No      Cervical laceration?: No    Clitoral laceration?: No

## 2025-03-20 LAB
BASOPHILS # BLD AUTO: 0.06 X10(3) UL (ref 0–0.2)
BASOPHILS NFR BLD AUTO: 0.4 %
DEPRECATED RDW RBC AUTO: 44.5 FL (ref 35.1–46.3)
EOSINOPHIL # BLD AUTO: 0.28 X10(3) UL (ref 0–0.7)
EOSINOPHIL NFR BLD AUTO: 2 %
ERYTHROCYTE [DISTWIDTH] IN BLOOD BY AUTOMATED COUNT: 15.5 % (ref 11–15)
GLUCOSE BLDC GLUCOMTR-MCNC: 106 MG/DL (ref 70–99)
HCT VFR BLD AUTO: 36.2 %
HGB BLD-MCNC: 12.3 G/DL
IMM GRANULOCYTES # BLD AUTO: 0.07 X10(3) UL (ref 0–1)
IMM GRANULOCYTES NFR BLD: 0.5 %
LYMPHOCYTES # BLD AUTO: 2.15 X10(3) UL (ref 1–4)
LYMPHOCYTES NFR BLD AUTO: 15.2 %
MCH RBC QN AUTO: 27.2 PG (ref 26–34)
MCHC RBC AUTO-ENTMCNC: 34 G/DL (ref 31–37)
MCV RBC AUTO: 79.9 FL
MONOCYTES # BLD AUTO: 1.42 X10(3) UL (ref 0.1–1)
MONOCYTES NFR BLD AUTO: 10 %
NEUTROPHILS # BLD AUTO: 10.16 X10 (3) UL (ref 1.5–7.7)
NEUTROPHILS # BLD AUTO: 10.16 X10(3) UL (ref 1.5–7.7)
NEUTROPHILS NFR BLD AUTO: 71.9 %
PLATELET # BLD AUTO: 240 10(3)UL (ref 150–450)
RBC # BLD AUTO: 4.53 X10(6)UL
WBC # BLD AUTO: 14.1 X10(3) UL (ref 4–11)

## 2025-03-20 PROCEDURE — 85025 COMPLETE CBC W/AUTO DIFF WBC: CPT | Performed by: OBSTETRICS & GYNECOLOGY

## 2025-03-20 PROCEDURE — 82962 GLUCOSE BLOOD TEST: CPT

## 2025-03-20 NOTE — PLAN OF CARE
Problem: POSTPARTUM  Goal: Long Term Goal:Experiences normal postpartum course  Description: INTERVENTIONS:- Assess and monitor vital signs and lab values.- Assess fundus and lochia.- Provide ice/sitz baths for perineum discomfort.- Monitor healing of incision/episiotomy/laceration, and assess for signs and symptoms of infection and hematoma.- Assess bladder function and monitor for bladder distention.- Provide/instruct/assist with pericare as needed.- Provide VTE prophylaxis as needed.- Monitor bowel function.- Encourage ambulation and provide assistance as needed.- Assess and monitor emotional status and provide social service/psych resources as needed.- Utilize standard precautions and use personal protective equipment as indicated. Ensure aseptic care of all intravenous lines and invasive tubes/drains.- Obtain immunization and exposure to communicable diseases history.  Outcome: Progressing  Goal: Optimize infant feeding at the breast  Description: INTERVENTIONS:- Initiate breast feeding within first hour after birth. - Monitor effectiveness of current breast feeding efforts.- Assess support systems available to mother/family.- Identify cultural beliefs/practices regarding lactation, letdown techniques, maternal food preferences.- Assess mother's knowledge and previous experience with breast feeding.- Provide information as needed about early infant feeding cues (e.g., rooting, lip smacking, sucking fingers/hand) versus late cue of crying.- Discuss/demonstrate breast feeding aids (e.g., infant sling, nursing footstool/pillows, and breast pumps).- Encourage mother/other family members to express feelings/concerns, and actively listen.- Educate father/SO about benefits of breast feeding and how to manage common lactation challenges.- Recommend avoidance of specific medications or substances incompatible with breast feeding.- Assess and monitor for signs of nipple pain/trauma.- Instruct and provide assistance  with proper latch.- Review techniques for milk expression (breast pumping) and storage of breast milk. Provide pumping equipment/supplies, instructions and assistance, as needed.- Encourage rooming-in and breast feeding on demand.- Encourage skin-to-skin contact.- Provide LC support as needed.- Assess for and manage engorgement.- Provide breast feeding education handouts and information on community breast feeding support.   Outcome: Progressing  Goal: Establishment of adequate milk supply with medication/procedure interruptions  Description: INTERVENTIONS:- Review techniques for milk expression (breast pumping). - Provide pumping equipment/supplies, instructions, and assistance until it is safe to breastfeed infant.  Outcome: Progressing  Goal: Appropriate maternal -  bonding  Description: INTERVENTIONS:- Assess caregiver- interactions.- Assess caregiver's emotional status and coping mechanisms.- Encourage caregiver to participate in  daily care.- Assess support systems available to mother/family.- Provide /case management support as needed.  Outcome: Progressing

## 2025-03-20 NOTE — LACTATION NOTE
03/20/25 0815   Evaluation Type   Evaluation Type Inpatient   Problems identified   Problems identified Knowledge deficit;Milk supply not WNL   Milk supply not WNL Reduced (potential)   Problems Identified Other Mother/baby separation   Maternal history   Maternal history Obesity   Breastfeeding goal   Breastfeeding goal To maintain breast milk feeding per patient goal   Maternal Assessment   Bilateral Breasts Soft;Symmetrical   Bilateral Nipples WNL;Pink;Elastic   Prior breastfeeding experience (comment below) Primip   Breastfeeding Assistance Hand expression provided with permission   Pain assessment   Treatment of Sore Nipples Lanolin   Guidelines for use of:   Equipment Lanolin   Breast pump type Ameda Platinum  (Mom Cozy)   Suggested use of pump Pump 8-12X/24hr   Reported pumping volumes (ml) drops   Other (comment) Mom currently pumping with her pump from home-Mom Cozy. Advised using the hospital grade double electric pump at bedside; rationale given. Reviewed importance of colostrum, especially for SCN baby. Taught mother hand expression. Small drops obtained and reviewed oral immune therapy. Taught parents how to label breast milk. Oral swabs brought to [facility].     Educated on pumping frequency and breast milk production. Discussed a pumping schedule. Parents voiced understanding.

## 2025-03-20 NOTE — CM/SW NOTE
Sw self referral for SCN Admission.  SW met with patient and FOB bedside.  SW confirmed face sheet contact as correct.    Baby boy name: Lukasz  Date & time of delivery: 3/19/25 13:30:57  Delivery method: Vaginal   Siblings age: NA    Patient employed: Yes  Length of maternity leave: As needed    Father of baby employed: Yes  Length of paternity leave:As needed    Breast or formula feed: Both    Pediatrician: Ally Gardiner    Infant Insurance: BCBS POS  Change HC contacted: NA    Mental Health History: Denies    Medications: Denies    Therapist: Mc    Psychiatrist: Mc DENNIS discussed signs, symptoms and risks associated with post partum depression & anxiety.  SW provided pt with PMAD resources.  Other resources provided: Depression and Anxiety informational, stress management informational, support groups, Mom line, Mood boost, SCN informational    Patient support system: FOB and family    Patient denied current questions/needs from SW.    SW to remain available for support and/or discharge planning.    Karyna Wilkerson MSW, LSW   [id number]

## 2025-03-20 NOTE — PLAN OF CARE
Problem: POSTPARTUM  Goal: Long Term Goal:Experiences normal postpartum course  Description: INTERVENTIONS:- Assess and monitor vital signs and lab values.- Assess fundus and lochia.- Provide ice/sitz baths for perineum discomfort.- Monitor healing of incision/episiotomy/laceration, and assess for signs and symptoms of infection and hematoma.- Assess bladder function and monitor for bladder distention.- Provide/instruct/assist with pericare as needed.- Provide VTE prophylaxis as needed.- Monitor bowel function.- Encourage ambulation and provide assistance as needed.- Assess and monitor emotional status and provide social service/psych resources as needed.- Utilize standard precautions and use personal protective equipment as indicated. Ensure aseptic care of all intravenous lines and invasive tubes/drains.- Obtain immunization and exposure to communicable diseases history.  Outcome: Progressing  Goal: Optimize infant feeding at the breast  Description: INTERVENTIONS:- Initiate breast feeding within first hour after birth. - Monitor effectiveness of current breast feeding efforts.- Assess support systems available to mother/family.- Identify cultural beliefs/practices regarding lactation, letdown techniques, maternal food preferences.- Assess mother's knowledge and previous experience with breast feeding.- Provide information as needed about early infant feeding cues (e.g., rooting, lip smacking, sucking fingers/hand) versus late cue of crying.- Discuss/demonstrate breast feeding aids (e.g., infant sling, nursing footstool/pillows, and breast pumps).- Encourage mother/other family members to express feelings/concerns, and actively listen.- Educate father/SO about benefits of breast feeding and how to manage common lactation challenges.- Recommend avoidance of specific medications or substances incompatible with breast feeding.- Assess and monitor for signs of nipple pain/trauma.- Instruct and provide assistance  with proper latch.- Review techniques for milk expression (breast pumping) and storage of breast milk. Provide pumping equipment/supplies, instructions and assistance, as needed.- Encourage rooming-in and breast feeding on demand.- Encourage skin-to-skin contact.- Provide LC support as needed.- Assess for and manage engorgement.- Provide breast feeding education handouts and information on community breast feeding support.   Outcome: Progressing  Goal: Establishment of adequate milk supply with medication/procedure interruptions  Description: INTERVENTIONS:- Review techniques for milk expression (breast pumping). - Provide pumping equipment/supplies, instructions, and assistance until it is safe to breastfeed infant.  Outcome: Progressing  Goal: Experiences normal breast weaning course  Description: INTERVENTIONS:- Assess for and manage engorgement.- Instruct on breast care.- Provide comfort measures.  Outcome: Completed  Goal: Appropriate maternal -  bonding  Description: INTERVENTIONS:- Assess caregiver- interactions.- Assess caregiver's emotional status and coping mechanisms.- Encourage caregiver to participate in  daily care.- Assess support systems available to mother/family.- Provide /case management support as needed.  Outcome: Progressing

## 2025-03-20 NOTE — LACTATION NOTE
This note was copied from a baby's chart.     03/20/25 0815   Evaluation Type   Evaluation Type Inpatient   Problems & Assessment   Problems Diagnosed or Identified Currently in NICU/SCN   Feeding Assessment   Summary Current Feeding OG/NG only

## 2025-03-20 NOTE — PAYOR COMM NOTE
--------------  CONTINUED STAY REVIEW    Payor: CHRIS SCHRADER/RAFI  Subscriber #:  [id number]  Authorization Number: [id number]    Admit date: 3/17/25  Admit time:  4:45 PM       Delivery      Head delivery date/time: 3/19/2025 13:30:45   Delivery date/time:  3/19/25 13:30:57   Delivery type: Normal spontaneous vaginal delivery   Measurements       Weight: 3990 g 8 lb 12.7 oz     Apgars     1 Minute:  5 Minute:  10 Minute:       Skin color: 0  2  2        Heart rate: 2  2  2        Reflex irritablity: 0  2  2        Muscle tone: 0  1  1        Respiratory effort: 0  1  2        Total: 2  8  9          3/20/25          Subjective    Denies focal complaints.  Lochia normal, pain well controlled.     Temp:  [97.9 °F (36.6 °C)-98.6 °F (37 °C)] 98.1 °F (36.7 °C)  Pulse:  [] 74  Resp:  [16-20] 20  BP: (103-147)/(56-85) 121/65  SpO2:  [96 %-100 %] 97 %     Constitutional: comfortable  fundus firm  No evidence of DVT seen on physical exam.     Lab Results   Component Value Date     WBC 14.1 2025     HGB 12.3 2025     HCT 36.2 2025     .0 2025       Assessment/Plan       PPD #1 s/p      Doing well.  Pain adequately controlled.  Lochia moderate.  Routine postpartum care.     Baby currently in NICU              Vitals (last day)       Date/Time Temp Pulse Resp BP SpO2 Weight O2 Device O2 Flow Rate (L/min) [facility]    25 0945 98.1 °F (36.7 °C) 74 20 121/65 -- -- None (Room air) -- MA    25 0135 98.2 °F (36.8 °C) 73 16 126/70 -- -- None (Room air) -- HK    25 1430 98.2 °F (36.8 °C) 88 16 139/73 99 % -- -- -- EM    25 1100 98 °F (36.7 °C) 89 16 148/81 98 % -- -- -- EM    25 0745 98.9 °F (37.2 °C) 96 16 104/85 97 % -- -- -- EM    25 0423 98.5 °F (36.9 °C) -- -- -- -- -- -- -- EH

## 2025-03-20 NOTE — PROGRESS NOTES
[facility]  part of [facility]    OB/GYNE Progress Note      Frank Powell Patient Status:  Inpatient    1995 MRN [id number]   Location [facility] 3SE Attending Consuelo Adan MD   Hosp Day # 3 PCP Hesham Segovia MD     Subjective     Denies focal complaints.  Lochia normal, pain well controlled.      Objective   Vital signs in last 24 hours:  Temp:  [97.9 °F (36.6 °C)-98.6 °F (37 °C)] 98.1 °F (36.7 °C)  Pulse:  [] 74  Resp:  [16-20] 20  BP: (103-147)/(56-85) 121/65  SpO2:  [96 %-100 %] 97 %    Input/Output:    Intake/Output Summary (Last 24 hours) at 3/20/2025 1254  Last data filed at 3/19/2025 2052  Gross per 24 hour   Intake 1436.34 ml   Output 1770 ml   Net -333.66 ml       Constitutional: comfortable  fundus firm  No evidence of DVT seen on physical exam.      Results:     Lab Results   Component Value Date    WBC 14.1 2025    HGB 12.3 2025    HCT 36.2 2025    .0 2025         No results found.      Assessment/Plan       Assessment  Problems:   Patient Active Problem List   Diagnosis    Obesity peds (BMI >=95 percentile)    History of slipped capital femoral epiphysis (SCFE)    Patellofemoral instability with pain    Pregnancy (HCC)        PPD #1 s/p     Doing well.  Pain adequately controlled.  Lochia moderate.  Routine postpartum care.    Baby currently in NICU, will want circumcision when out of NICU.    Patient desires circumcision for her son.  Risks and benefits reviewed, including, but not limited to bleeding, infection and displeasure with cosmetic appearance.  Patient verbalized understanding and has signed the consent.      Jeni Hannon MD  3/20/2025  12:54 PM

## 2025-03-21 VITALS
WEIGHT: 292 LBS | OXYGEN SATURATION: 97 % | DIASTOLIC BLOOD PRESSURE: 77 MMHG | TEMPERATURE: 98 F | SYSTOLIC BLOOD PRESSURE: 136 MMHG | BODY MASS INDEX: 49 KG/M2 | RESPIRATION RATE: 18 BRPM | HEART RATE: 79 BPM

## 2025-03-21 PROBLEM — Z34.90 PREGNANCY (HCC): Status: RESOLVED | Noted: 2025-03-17 | Resolved: 2025-03-21

## 2025-03-21 LAB — GLUCOSE BLDC GLUCOMTR-MCNC: 94 MG/DL (ref 70–99)

## 2025-03-21 PROCEDURE — 82962 GLUCOSE BLOOD TEST: CPT

## 2025-03-21 RX ORDER — IBUPROFEN 200 MG
600 TABLET ORAL EVERY 6 HOURS PRN
Qty: 20 TABLET | Refills: 0 | Status: SHIPPED | COMMUNITY
Start: 2025-03-21

## 2025-03-21 RX ORDER — ACETAMINOPHEN 500 MG
500 TABLET ORAL EVERY 6 HOURS PRN
Qty: 20 TABLET | Refills: 0 | Status: SHIPPED | COMMUNITY
Start: 2025-03-21

## 2025-03-21 NOTE — LACTATION NOTE
03/21/25 0815   Evaluation Type   Evaluation Type Inpatient   Problems identified   Problems identified Knowledge deficit;Milk supply not WNL   Milk supply not WNL Reduced (potential)   Problems Identified Other Mother/baby separation   Maternal history   Maternal history Obesity   Breastfeeding goal   Breastfeeding goal To maintain breast milk feeding per patient goal   Maternal Assessment   Prior breastfeeding experience (comment below) Primip   Breastfeeding Assistance Breastfeeding assistance provided with permission;Pumping assistance provided with permission   Pain assessment   Location/Comment denies   Guidelines for use of:   Breast pump type Ameda Platinum  (mom rupert)   Current use of pump: every three hours   Suggested use of pump Pump 8-12X/24hr   Other (comment) Infant in [facility], mom has been pumping every three hours getting drops, plan is for her to be discharged today, she prefers to pump with her mom rupert, offered [facility] grade pump rental and [place] [facility] and to verify insurance coverage prior to visit, encouraged to call if needed.

## 2025-03-21 NOTE — PLAN OF CARE
Discharge instructions reviewed. Discharging with  in stable condition. Baby in SCN.     Problem: POSTPARTUM  Goal: Long Term Goal:Experiences normal postpartum course  Description: INTERVENTIONS:- Assess and monitor vital signs and lab values.- Assess fundus and lochia.- Provide ice/sitz baths for perineum discomfort.- Monitor healing of incision/episiotomy/laceration, and assess for signs and symptoms of infection and hematoma.- Assess bladder function and monitor for bladder distention.- Provide/instruct/assist with pericare as needed.- Provide VTE prophylaxis as needed.- Monitor bowel function.- Encourage ambulation and provide assistance as needed.- Assess and monitor emotional status and provide social service/psych resources as needed.- Utilize standard precautions and use personal protective equipment as indicated. Ensure aseptic care of all intravenous lines and invasive tubes/drains.- Obtain immunization and exposure to communicable diseases history.  Outcome: Adequate for Discharge  Goal: Optimize infant feeding at the breast  Description: INTERVENTIONS:- Initiate breast feeding within first hour after birth. - Monitor effectiveness of current breast feeding efforts.- Assess support systems available to mother/family.- Identify cultural beliefs/practices regarding lactation, letdown techniques, maternal food preferences.- Assess mother's knowledge and previous experience with breast feeding.- Provide information as needed about early infant feeding cues (e.g., rooting, lip smacking, sucking fingers/hand) versus late cue of crying.- Discuss/demonstrate breast feeding aids (e.g., infant sling, nursing footstool/pillows, and breast pumps).- Encourage mother/other family members to express feelings/concerns, and actively listen.- Educate father/SO about benefits of breast feeding and how to manage common lactation challenges.- Recommend avoidance of specific medications or substances incompatible with  breast feeding.- Assess and monitor for signs of nipple pain/trauma.- Instruct and provide assistance with proper latch.- Review techniques for milk expression (breast pumping) and storage of breast milk. Provide pumping equipment/supplies, instructions and assistance, as needed.- Encourage rooming-in and breast feeding on demand.- Encourage skin-to-skin contact.- Provide LC support as needed.- Assess for and manage engorgement.- Provide breast feeding education handouts and information on community breast feeding support.   Outcome: Adequate for Discharge  Goal: Establishment of adequate milk supply with medication/procedure interruptions  Description: INTERVENTIONS:- Review techniques for milk expression (breast pumping). - Provide pumping equipment/supplies, instructions, and assistance until it is safe to breastfeed infant.  Outcome: Adequate for Discharge  Goal: Appropriate maternal -  bonding  Description: INTERVENTIONS:- Assess caregiver- interactions.- Assess caregiver's emotional status and coping mechanisms.- Encourage caregiver to participate in  daily care.- Assess support systems available to mother/family.- Provide /case management support as needed.  Outcome: Adequate for Discharge

## 2025-03-21 NOTE — DISCHARGE SUMMARY
[facility]  part of [facility]    Obstetrical Discharge Summary    Frank Powell Patient Status:  Inpatient    1995 MRN [id number]   Location [facility] 3SE Attending Consuelo Adan MD   Hosp Day # 4 PCP Hesham Segovia MD     Date of Admission: 3/17/2025     Date of Discharge: 3/21/2025    Admitting Diagnosis: pregnancy  Pregnancy (HCC)    Discharge Diagnosis: .Active Problems:    * No active hospital problems. *      Disposition: home    Hospital Course:   Reason for Admission:  Frank Powell is a 29 year old  who was admitted with Estimated Date of Delivery: 3/31/25. She presented for induction of labor, h/o A2GDM and obesity.       Hospital Course: Pt underwent a spontaneous vaginal. Refer to delivery note/ operative report for details.  Pt was transferred to mother/ baby.  She underwent an uncomplicated postpartum course.  Pt is being discharged today.    Date of Delivery: 3/19/2025   Time of Delivery: 1:30 PM  Delivery Type: Normal spontaneous vaginal delivery    Live   Information for the patient's :  Yazan Powell [[id number]]   male infant   Information for the patient's :  Yazan Powell [[id number]]   8 lb 12.7 oz (3.99 kg)   Apgars:  1 minute: 2               5 minutes: 8                        10 minutes: 9      Postpartum Course: Her postpartum course was uncomplicated     Results:   Recent Results (from the past 2 weeks)   CBC With Differential With Platelet    Collection Time: 25  6:28 PM   Result Value Ref Range    WBC 12.0 (H) 4.0 - 11.0 x10(3) uL    RBC 5.08 3.80 - 5.30 x10(6)uL    HGB 13.4 12.0 - 16.0 g/dL    HCT 40.3 35.0 - 48.0 %    MCV 79.3 (L) 80.0 - 100.0 fL    MCH 26.4 26.0 - 34.0 pg    MCHC 33.3 31.0 - 37.0 g/dL    RDW-SD 43.4 35.1 - 46.3 fL    RDW 15.2 (H) 11.0 - 15.0 %    .0 150.0 - 450.0 10(3)uL    Neutrophil Absolute Prelim 8.34 (H) 1.50 - 7.70 x10 (3) uL    Neutrophil Absolute 8.34 (H) 1.50 - 7.70 x10(3) uL     Lymphocyte Absolute 1.96 1.00 - 4.00 x10(3) uL    Monocyte Absolute 1.34 (H) 0.10 - 1.00 x10(3) uL    Eosinophil Absolute 0.24 0.00 - 0.70 x10(3) uL    Basophil Absolute 0.05 0.00 - 0.20 x10(3) uL    Immature Granulocyte Absolute 0.07 0.00 - 1.00 x10(3) uL    Neutrophil % 69.5 %    Lymphocyte % 16.3 %    Monocyte % 11.2 %    Eosinophil % 2.0 %    Basophil % 0.4 %    Immature Granulocyte % 0.6 %   T Pallidum Screening Cascade    Collection Time: 03/17/25  6:28 PM   Result Value Ref Range    Treponemal Antibodies Nonreactive Nonreactive    ABORH (Blood Type)    Collection Time: 03/17/25  6:28 PM   Result Value Ref Range    ABO BLOOD TYPE O     RH BLOOD TYPE Positive    Antibody Screen    Collection Time: 03/17/25  6:28 PM   Result Value Ref Range    Antibody Screen Negative    ABORH Confirmation    Collection Time: 03/17/25  6:49 PM   Result Value Ref Range    ABO BLOOD TYPE O     RH BLOOD TYPE Positive    POCT Glucose    Collection Time: 03/17/25  7:19 PM   Result Value Ref Range    POC Glucose  85 70 - 99 mg/dL   POCT Glucose    Collection Time: 03/17/25 11:38 PM   Result Value Ref Range    POC Glucose  87 70 - 99 mg/dL   POCT Glucose    Collection Time: 03/18/25  3:40 AM   Result Value Ref Range    POC Glucose  78 70 - 99 mg/dL   POCT Glucose    Collection Time: 03/18/25 11:10 AM   Result Value Ref Range    POC Glucose  87 70 - 99 mg/dL   POCT Glucose    Collection Time: 03/18/25  3:16 PM   Result Value Ref Range    POC Glucose  69 (L) 70 - 99 mg/dL   POCT Glucose    Collection Time: 03/18/25  7:23 PM   Result Value Ref Range    POC Glucose  79 70 - 99 mg/dL   Protein/Creatinine Ratio, Urine Random    Collection Time: 03/18/25  7:56 PM   Result Value Ref Range    Total Protein Urine Random <6.0 <14.0 mg/dL    Creatinine Ur Random <3.00 mg/dL    Urine Protein/Creatinine Ratio, Random     Comp Metabolic Panel (14)    Collection Time: 03/18/25  8:31 PM   Result Value Ref Range    Glucose 96 70 - 99 mg/dL    Sodium  138 136 - 145 mmol/L    Potassium 3.8 3.5 - 5.1 mmol/L    Chloride 107 98 - 112 mmol/L    CO2 22.0 21.0 - 32.0 mmol/L    Anion Gap 9 0 - 18 mmol/L    BUN 8 (L) 9 - 23 mg/dL    Creatinine 0.87 0.55 - 1.02 mg/dL    BUN/CREA Ratio 9.2 (L) 10.0 - 20.0    Calcium, Total 9.3 8.7 - 10.4 mg/dL    Calculated Osmolality 284 275 - 295 mOsm/kg    eGFR-Cr 92 >=60 mL/min/1.73m2    ALT 12 10 - 49 U/L    AST 16 <34 U/L    Alkaline Phosphatase 117 (H) 37 - 98 U/L    Bilirubin, Total 0.7 0.3 - 1.2 mg/dL    Total Protein 6.1 5.7 - 8.2 g/dL    Albumin 3.8 3.2 - 4.8 g/dL    Globulin  2.3 2.0 - 3.5 g/dL    A/G Ratio 1.7 1.0 - 2.0   POCT Glucose    Collection Time: 03/18/25  9:34 PM   Result Value Ref Range    POC Glucose  90 70 - 99 mg/dL   POCT Glucose    Collection Time: 03/18/25 11:34 PM   Result Value Ref Range    POC Glucose  84 70 - 99 mg/dL   POCT Glucose    Collection Time: 03/19/25  4:22 AM   Result Value Ref Range    POC Glucose  82 70 - 99 mg/dL   POCT Glucose    Collection Time: 03/19/25  6:33 AM   Result Value Ref Range    POC Glucose  84 70 - 99 mg/dL   POCT Glucose    Collection Time: 03/19/25  8:44 AM   Result Value Ref Range    POC Glucose  78 70 - 99 mg/dL   POCT Glucose    Collection Time: 03/19/25 10:11 AM   Result Value Ref Range    POC Glucose  88 70 - 99 mg/dL   POCT Glucose    Collection Time: 03/19/25 11:17 AM   Result Value Ref Range    POC Glucose  80 70 - 99 mg/dL   CBC With Differential With Platelet    Collection Time: 03/20/25  5:51 AM   Result Value Ref Range    WBC 14.1 (H) 4.0 - 11.0 x10(3) uL    RBC 4.53 3.80 - 5.30 x10(6)uL    HGB 12.3 12.0 - 16.0 g/dL    HCT 36.2 35.0 - 48.0 %    MCV 79.9 (L) 80.0 - 100.0 fL    MCH 27.2 26.0 - 34.0 pg    MCHC 34.0 31.0 - 37.0 g/dL    RDW-SD 44.5 35.1 - 46.3 fL    RDW 15.5 (H) 11.0 - 15.0 %    .0 150.0 - 450.0 10(3)uL    Neutrophil Absolute Prelim 10.16 (H) 1.50 - 7.70 x10 (3) uL    Neutrophil Absolute 10.16 (H) 1.50 - 7.70 x10(3) uL    Lymphocyte Absolute 2.15  1.00 - 4.00 x10(3) uL    Monocyte Absolute 1.42 (H) 0.10 - 1.00 x10(3) uL    Eosinophil Absolute 0.28 0.00 - 0.70 x10(3) uL    Basophil Absolute 0.06 0.00 - 0.20 x10(3) uL    Immature Granulocyte Absolute 0.07 0.00 - 1.00 x10(3) uL    Neutrophil % 71.9 %    Lymphocyte % 15.2 %    Monocyte % 10.0 %    Eosinophil % 2.0 %    Basophil % 0.4 %    Immature Granulocyte % 0.5 %   POCT Glucose    Collection Time: 03/20/25  7:47 AM   Result Value Ref Range    POC Glucose  106 (H) 70 - 99 mg/dL   POCT Glucose    Collection Time: 03/21/25  5:14 AM   Result Value Ref Range    POC Glucose  94 70 - 99 mg/dL     No results for input(s): \"ABORH\" in the last 72 hours.  Recent Labs     03/20/25  0551   WBC 14.1*   HGB 12.3   HCT 36.2     No results found.    Complications: none       Discharge Plan:   Discharge Condition: stable     Discharge Meds:   Current Discharge Medication List        New Orders    Details   acetaminophen 500 MG Oral Tab Take 1 tablet (500 mg total) by mouth every 6 (six) hours as needed.      ibuprofen 200 MG Oral Tab Take 3 tablets (600 mg total) by mouth every 6 (six) hours as needed for Pain.           Home Meds - Unchanged    Details   Prenatal Vit-Fe Fumarate-FA (MULTI PRENATAL) 27-0.8 MG Oral Tab Take by mouth.      metFORMIN  MG Oral Tablet 24 Hr Take 2 tablets (1,500 mg total) by mouth daily with breakfast.                   Discharge Activity: Pelvic rest until cleared  No heavy lifting >15 lbs  Abstain from sexual intercourse until cleared at 6 wks PP  No driving the first week and when on opiates  Activity as tolerated.    No exercise until cleared at 6 wks PP    Follow up:     Follow up in office: 6 weeks  Infant is in special care nursery         Fanny Vidal MD  3/21/2025

## 2025-03-21 NOTE — PROGRESS NOTES
[facility]    OB/Gyne Postpartum Progress Note      Frank Powell Patient Status:  Inpatient    1995 MRN [id number]   Location [facility] 3SE Attending Consuelo Adan MD   Hosp Day # 4 PCP Hesham Segovia MD       Subjective      Patient feeling well.   Pain well controlled. Lochia appropriate.   Tolerating diet. Denies N/V  Ambulating. Spontaneously voiding. Passing flatus   Breastfeeding  Baby boy remains in NICU    Objective   Vital signs in last 24 hours:  Temp:  [97.7 °F (36.5 °C)-98 °F (36.7 °C)] 98 °F (36.7 °C)  Pulse:  [70-84] 79  Resp:  [18] 18  BP: (104-142)/(53-77) 136/77    Input/Output:  No intake or output data in the 24 hours ending 25 1545      Constitutional: comfortable  Pulm: non labored breathing  CV: regular rate  Abdomen: soft, nontender, nondistended  Uterus: fundus firm at umbilicus,   Extremities: No calf tenderness      Results:   Labs / Path / Radiology:    Recent Results (from the past 24 hours)   POCT Glucose    Collection Time: 25  5:14 AM   Result Value Ref Range    POC Glucose  94 70 - 99 mg/dL       Recent Labs   Lab 25  1828 25  0551   RBC 5.08 4.53   HGB 13.4 12.3   HCT 40.3 36.2   MCV 79.3* 79.9*   MCH 26.4 27.2   MCHC 33.3 34.0   RDW 15.2* 15.5*   NEPRELIM 8.34* 10.16*   WBC 12.0* 14.1*   .0 240.0             Assessment/Plan   29 year oldyo  , s/p , postpartum day 2.      Postpartum:  -General diet, ambulating  -Voiding freely   -Meeting milestones    Discharge home today  Discussed restrictions, instructions, medications  F/U in office in 6 weeks       Fanny Vidal MD  3/21/2025  3:45 PM

## 2025-04-06 ENCOUNTER — TELEPHONE (OUTPATIENT)
Dept: OBGYN UNIT | Facility: HOSPITAL | Age: 30
End: 2025-04-06

## (undated) NOTE — LETTER
[facility]  Administration of Anesthesia  DANNI Frank GEOVANNA Powell agree to be cared for by a physician anesthesiologist alone and/or with a nurse anesthetist, who is specially trained to monitor me and give me medicine to put me to sleep or keep me comfortable during my procedure    I understand that my anesthesiologist and/or anesthetist is not an employee or agent of [facility] or [employer]. He or she works for [facility]    As the patient asking for anesthesia services, I agree to:  Allow the anesthesiologist (anesthesia doctor) to give me medicine and do additional procedures as necessary. Some examples are: Starting or using an “IV” to give me medicine, fluids or blood during my procedure, and having a breathing tube placed to help me breathe when I’m asleep (intubation). In the event that my heart stops working properly, I understand that my anesthesiologist will make every effort to sustain my life, unless otherwise directed by [facility] Do Not Resuscitate documents.  Tell my anesthesia doctor before my procedure:  If I am pregnant.  The last time that I ate or drank.  iii. All of the medicines I take (including prescriptions, herbal supplements, and pills I can buy without a prescription (including street drugs/illegal medications). Failure to inform my anesthesiologist about these medicines may increase my risk of anesthetic complications.  iv.If I am allergic to anything or have had a reaction to anesthesia before.  I understand how the anesthesia medicine will help me (benefits).  I understand that with any type of anesthesia medicine there are risks:  The most common risks are: nausea, vomiting, sore throat, muscle soreness, damage to my eyes, mouth, or teeth (from breathing tube placement).  Rare risks include: remembering what happened during my procedure, allergic reactions to medications, injury to my airway, heart, lungs, vision, nerves, or  muscles and in extremely rare instances death.  My doctor has explained to me other choices available to me for my care (alternatives).  Pregnant Patients (“epidural”):  I understand that the risks of having an epidural (medicine given into my back to help control pain during labor), include itching, low blood pressure, difficulty urinating, headache or slowing of the baby’s heart. Very rare risks include infection, bleeding, seizure, irregular heart rhythms and nerve injury.  Regional Anesthesia (“spinal”, “epidural”, & “nerve blocks”):  I understand that rare but potential complications include headache, bleeding, infection, seizure, irregular heart rhythms, and nerve injury.    _____________________________________________________________________________  Patient (or Representative) Signature/Relationship to Patient  Date   Time    _____________________________________________________________________________   Name (if used)    Language/Organization   Time    _____________________________________________________________________________  Nurse Anesthetist Signature     Date   Time  _____________________________________________________________________________  Anesthesiologist Signature     Date   Time  I have discussed the procedure and information above with the patient (or patient’s representative) and answered their questions. The patient or their representative has agreed to have anesthesia services.    _____________________________________________________________________________  Witness        Date   Time  I have verified that the signature is that of the patient or patient’s representative, and that it was signed before the procedure  Patient Name: Frank Oseihy     : 1995                 Printed: 3/17/2025 at 4:45 PM    Medical Record #: [id number]                                            Page 1 of 1  ----------ANESTHESIA CONSENT----------